# Patient Record
Sex: MALE | Race: WHITE
[De-identification: names, ages, dates, MRNs, and addresses within clinical notes are randomized per-mention and may not be internally consistent; named-entity substitution may affect disease eponyms.]

---

## 2021-02-12 ENCOUNTER — HOSPITAL ENCOUNTER (OUTPATIENT)
Dept: HOSPITAL 53 - M LABSMTC | Age: 61
End: 2021-02-12
Attending: ANESTHESIOLOGY
Payer: COMMERCIAL

## 2021-02-12 DIAGNOSIS — Z01.812: Primary | ICD-10-CM

## 2021-02-12 DIAGNOSIS — Z20.822: ICD-10-CM

## 2021-02-17 ENCOUNTER — HOSPITAL ENCOUNTER (OUTPATIENT)
Dept: HOSPITAL 53 - M OPP | Age: 61
Discharge: HOME | End: 2021-02-17
Attending: SURGERY
Payer: OTHER GOVERNMENT

## 2021-02-17 VITALS — WEIGHT: 184 LBS | BODY MASS INDEX: 27.25 KG/M2 | HEIGHT: 69 IN

## 2021-02-17 VITALS — SYSTOLIC BLOOD PRESSURE: 120 MMHG | DIASTOLIC BLOOD PRESSURE: 89 MMHG

## 2021-02-17 DIAGNOSIS — R12: ICD-10-CM

## 2021-02-17 DIAGNOSIS — Z12.11: Primary | ICD-10-CM

## 2021-02-17 DIAGNOSIS — F17.210: ICD-10-CM

## 2021-02-17 DIAGNOSIS — Z79.899: ICD-10-CM

## 2021-02-17 DIAGNOSIS — E78.5: ICD-10-CM

## 2021-02-17 DIAGNOSIS — F41.9: ICD-10-CM

## 2021-02-17 DIAGNOSIS — K57.30: ICD-10-CM

## 2021-02-17 DIAGNOSIS — F32.9: ICD-10-CM

## 2021-02-17 DIAGNOSIS — G47.30: ICD-10-CM

## 2021-02-17 DIAGNOSIS — K64.0: ICD-10-CM

## 2021-02-17 DIAGNOSIS — I10: ICD-10-CM

## 2021-02-17 DIAGNOSIS — K63.5: ICD-10-CM

## 2021-02-17 DIAGNOSIS — J44.9: ICD-10-CM

## 2021-02-17 DIAGNOSIS — Z86.010: ICD-10-CM

## 2021-02-17 NOTE — CCD
Summarization Of Episode

                             Created on: 2021



JOHNATHAN KENT

External Reference #: [id number]

: 1960

Sex: Male



Demographics





                          Address                   [address] [address]  [address]

 

                          Home Phone                [phone]

 

                          Preferred Language        English

 

                          Marital Status            

 

                          Jew Affiliation     CA CV

 

                          Race                      White

 

                          Ethnic Group              Not  or 





Author





                          Author                    [facility]

 

                          Organization              [facility]

 

                          Address                   Unknown

 

                          Phone                     Unavailable







Support





                Name            Relationship    Address         Phone

 

                    LIZ [employer]  Next Of Kin         [address] [place]  [address] [phone]

 

                    YOLI  IVELISSE   Next Of Kin         [address] [address]  [address]                 [phone]

 

                RE              Next Of Kin     Unknown         Unavailable



                                  



Re-disclosure Warning

          The records that you are about to access may contain information from 
federally-assisted alcohol or drug abuse programs. If such information is 
present, then the following federally mandated warning applies: This information
has been disclosed to you from records protected by federal confidentiality 
rules (42 CFR part 2). The federal rules prohibit you from making any further 
disclosure of this information unless further disclosure is expressly permitted 
by the written consent of the person to whom it pertains or as otherwise 
permitted by 42 CFR part 2. A general authorization for the release of medical 
or other information is NOT sufficient for this purpose. The Federal rules 
restrict any use of the information to criminally investigate or prosecute any 
alcohol or drug abuse patient.The records that you are about to access may 
contain highly sensitive health information, the redisclosure of which is 
protected by Article 27-F of the [facility] Public Health law. If you 
continue you may have access to information: Regarding HIV / AIDS; Provided by 
facilities licensed or operated by the [facility] Office of Mental Health; 
or Provided by the [facility] Office for People With Developmental 
Disabilities. If such information is present, then the following [place] 
mandated warning applies: This information has been disclosed to you from 
confidential records which are protected by state law. State law prohibits you 
from making any further disclosure of this information without the specific 
written consent of the person to whom it pertains, or as otherwise permitted by 
law. Any unauthorized further disclosure in violation of state law may result in
a fine or MCC sentence or both. A general authorization for the release of 
medical or other information is NOT sufficient authorization for further disc
losure.                                                          



Insurance Providers

          



             Payer name   Policy type / Coverage type Policy ID    Covered party

 ID Covered 

party's relationship to bennett Policy Bennett             Plan Information

 

          [employer]           [id number]           SP             

     [id number]

 

          [facility]                               [facility]                   

 

          [employer] [facility] [employer]           [id number]           SP        

          [id number]

 

          [employer]           [id number]           SP        

          [id number]

 

          [employer]           [id number]           SP             

     [id number]

 

          [facility]         [id number]           S                   0

[id number]



                                                                                
                                                         



Problems, Conditions, and Diagnoses

          



           Code       Display Name Description Problem Type Effective Dates Data

 Source(s)

 

             [id number]     Essential hypertension Essential hypertension Problem 

     2021 

12:00:00 AM EST                         [facility] ([facility])



                                                                                
       



Results

          



                    ID                  Date                Data Source

 

                    [id number]         2021 09:40:00 AM EST [facility]









          Name      Value     Range     Interpretation Code Description Data Christine

rce(s) Supporting 

Document(s)

 

          SARS coronavirus 2 RNA Not Detected                               [facility]

[place]     

 

                                        This lab was ordered by [facility] and reported by [facility]. 









                                        Procedure

 

                                          



                                                                                
                           



Vital Signs

          



                    ID                  Date                Data Source

 

                    UNK                                      









           Name       Value      Range      Interpretation Code Description Data

 Source(s)

 

           Body surface area Derived from formula 2.01 m2                       

   2.01 m2    [facility] ([facility])

 

           Body weight 85.334 kg                        85.334 kg  [facility] ([facility])

 

           Ideal body weight 160 [lb_av]                       160 [facility]av] [facility]

[facility] ([facility]

 

           Body mass index (BMI) [Ratio] 27.8 kg/m2                       27.8 k

g/m2 [facility] ([facility])

 

           Body weight 188.12 [lb_av]                       188.12 [lb_av] [facility]

[facility] ([facility])

 

           Body height 69 [in_i]                        69 [in_i]  [facility] ([facility])

 

                                        5'9" 

 

           Diastolic blood pressure 96 mm[Hg]                        96 mm[Hg]  

[facility] ([facility])

 

           Systolic blood pressure 162 mm[Hg]                       162 mm[Hg] [facility]

## 2021-02-17 NOTE — CCD
Continuity of Care Document (CCD)

                             Created on: 2021



Jez Johns

External Reference #: [id number]

: 1960

Sex: Male



Demographics





                          Address                   [address] [place] [address] [address]

[place]  [address]

 

                          Home Phone                [phone]

 

                          Preferred Language        Unknown

 

                          Marital Status            Unknown

 

                          Mandaeism Affiliation     Unknown

 

                          Race                      White

 

                          Ethnic Group              Not  or 





Author





                          [facility]

 

                          Organization              Unknown

 

                          Address                   [address] [place] [address], [address]

[address]

[place]  [address]



 

                          Phone                     [phone]







Care Team Providers





                    Care Team Member Name Role                Phone

 

                    Yaya Morales M.D. AUTM                [phone]







Problems





                    Active Problems     Provider            Date

 

                    Essential hypertension Liborio Guerrero NP    Onset: 2021







Social History





                Type            Date            Description     Comments

 

                Birth Sex                       Unknown          

 

                ETOH Use                        Denies alcohol use  

 

                Tobacco Use     Start: 91 Patient is a current smoker, smo

kes every day 1 PPD 

 

                Recreational Drug Use                 Denies Drug Use  







Allergies, Adverse Reactions, Alerts





                                        Description

 

                                        No Known Drug Allergies







Medications





           Active Medications SIG        Qnty       Indications Ordering Provide

r Date

 

                          Fish Oil                     1000mg Capsules          

         2 by mouth every 

day                                             Unknown         

 

                          Aspirin 81 Low Dose                     81mg Chewtabs 

                  1 by 

mouth qd                                        Unknown         

 

                          Rosuvastatin Calcium                     40mg Tablets 

                  once a 

day                                             Unknown         

 

             Lisinopril                     20mg Tablets                   1 tab

 po qd                            

Unknown                                 

 

                          Spiriva Respimat                     1.25mcg/Act Aeros

ol                   2 

puffs every day                                 Unknown         

 

                          Multivitamin Adult                      Tablets       

            1 by mouth 

every day                                       Unknown         







Immunizations





                                        Description

 

                                        No Information Available







Vital Signs





                Date            Vital           Result          Comment

 

                2021  9:36am BP Systolic     162 mmHg         

 

                    BP Diastolic        96 mmHg              

 

                    Height              69 inches           5'9"

 

                    Weight              188.12 lb            

 

                    BMI (Body Mass Index) 27.8 kg/m2           

 

                    Ideal Body Weight   160 lb               

 

                    Weight              85.334 kg            

 

                    BSA (Body Surface Area) 2.01 m2              







Results





                                        Description

 

                                        No Information Available







Procedures





                                        Description

 

                                        No Information Available







Medical Devices





                                        Description

 

                                        No Information Available







Encounters





                                        Description

 

                                        No Information Available







Assessments





                Date            Code            Description     Provider

 

                2021      Z86.010         Personal history of colonic poly

ps Liborio Guerrero NP







Plan of Treatment

2021 - Liborio Guerrero NP* Z86.010 Personal history of colonic polyps* 
  Comments:* Patient has had a personal history of colonic polyps and at this 
  point the recommendation is to proceed with a repeat colonoscopy risks as well
  as benefits have been discussed at length those including but not limited to 
  bleeding infection damage to bowel including perforation and possibly 
  anesthetic complications. Patient understands as well that small 
  lesions/polyps can be missed with increased frequency and is much dependent on
  colonic prep. The patient agrees to proceed with colonoscopy as recommended.









Functional Status





                                        Description

 

                                        No Information Available







Mental Status





                                        Description

 

                                        No Information Available







Referrals





                Refer to      Reason for Referral Status          Appt Date

 

                Austin Foster D.O. COLONOCSOPY     Created         

00

 

                                        [address] [place] [address] [phone]

 

                                          

 

                Austin Foster D.O. SCHEDULE COLONOSCOPY AND EGD  Scheduled 

      2021

 

                                        [address] [place] [address] [phone]

## 2021-02-17 NOTE — ROOR
________________________________________________________________________________

Patient Name: Jez Tolentino            Procedure Date: 2/17/2021 12:46 PM

MRN: [id number]                          Account Number: [id number]

YOB: 1960               Age: 60

Room: [facility]                            Gender: Male

Note Status: Finalized                 

________________________________________________________________________________

 

Procedure:            Colonoscopy

Indications:          High risk colon cancer surveillance: Personal history of 

                      colonic polyps

Providers:            DO Liberty Coleman MD:         [facility] [place], 

                      Admin., Yaya Morales MD

Requesting Provider:  

Medicines:            Propofol per Anesthesia

Complications:        No immediate complications.

________________________________________________________________________________

Procedure:            Pre-Anesthesia Assessment:

                      - Prior to the procedure, a History and Physical was 

                      performed, and patient medications and allergies were 

                      reviewed. The patient is competent. The risks and 

                      benefits of the procedure and the sedation options and 

                      risks were discussed with the patient. All questions 

                      were answered and informed consent was obtained. Patient 

                      identification and proposed procedure were verified by 

                      the physician, the nurse, the anesthetist and the 

                      technician in the endoscopy suite. Mental Status 

                      Examination: alert and oriented. Airway Examination: 

                      normal oropharyngeal airway and neck mobility. 

                      Respiratory Examination: clear to auscultation. CV 

                      Examination: normal. Prophylactic Antibiotics: The 

                      patient does not require prophylactic antibiotics. Prior 

                      Anticoagulants: The patient has taken no previous 

                      anticoagulant or antiplatelet agents. ASA Grade 

                      Assessment: II - A patient with mild systemic disease. 

                      After reviewing the risks and benefits, the patient was 

                      deemed in satisfactory condition to undergo the 

                      procedure. The anesthesia plan was to use monitored 

                      anesthesia care (MAC). Immediately prior to 

                      administration of medications, the patient was 

                      re-assessed for adequacy to receive sedatives. The heart 

                      rate, respiratory rate, oxygen saturations, blood 

                      pressure, adequacy of pulmonary ventilation, and 

                      response to care were monitored throughout the 

                      procedure. The physical status of the patient was 

                      re-assessed after the procedure.

                      The Colonoscope was introduced through the anus and 

                      advanced to the cecum, identified by appendiceal orifice 

                      and ileocecal valve. The colonoscopy was performed 

                      without difficulty. The patient tolerated the procedure 

                      well.

                                                                                

Findings:

     Non-bleeding internal hemorrhoids were found during retroflexion. The 

     hemorrhoids were Grade I (internal hemorrhoids that do not prolapse).

     Multiple small and large-mouthed diverticula were found in the entire 

     colon.

     Two flat polyps were found in the sigmoid colon. The polyps were less 

     than 5 mm in size. These polyps were removed with a jumbo cold forceps. 

     Resection and retrieval were complete. Estimated blood loss was minimal.

                                                                                

Impression:           - Non-bleeding internal hemorrhoids.

                      - Diverticulosis in the entire examined colon.

                      - Two less than 5 mm polyps in the sigmoid colon, 

                      removed with a jumbo cold forceps. Resected and 

                      retrieved.

Recommendation:       - Patient has a contact number available for 

                      emergencies. The signs and symptoms of potential delayed 

                      complications were discussed with the patient. Return to 

                      normal activities tomorrow. Written discharge 

                      instructions were provided to the patient.

                      - Repeat colonoscopy in 5-10 years for surveillance 

                      based on pathology results.

                      - Return to my office at appointment to be scheduled.

                      - Return to physician assistant at appointment to be 

                      scheduled.

                                                                                

Procedure Code(s):    --- Professional ---

                      84521, Colonoscopy, flexible; with biopsy, single or 

                      multiple

Diagnosis Code(s):    --- Professional ---

                      Z86.010, Personal history of colonic polyps

                      K63.5, Polyp of colon

                      K64.0, First degree hemorrhoids

                      K57.30, Diverticulosis of large intestine without 

                      perforation or abscess without bleeding

 

CPT copyright 2019 [employer]. All rights reserved.

 

The codes documented in this report are preliminary and upon  review may 

be revised to meet current compliance requirements.

 

_________________

Austin Foster DO

2/17/2021 1:11:58 PM

Electronically signed by Austin Foster DO

Number of Addenda: 0

 

Note Initiated On: 2/17/2021 12:46 PM

Estimated Blood Loss: Estimated blood loss was minimal.